# Patient Record
Sex: MALE | Race: WHITE | NOT HISPANIC OR LATINO | Employment: UNEMPLOYED | URBAN - METROPOLITAN AREA
[De-identification: names, ages, dates, MRNs, and addresses within clinical notes are randomized per-mention and may not be internally consistent; named-entity substitution may affect disease eponyms.]

---

## 2017-06-12 ENCOUNTER — OFFICE VISIT (OUTPATIENT)
Dept: FAMILY MEDICINE | Facility: CLINIC | Age: 47
End: 2017-06-12

## 2017-06-12 VITALS
WEIGHT: 215.4 LBS | HEIGHT: 71 IN | OXYGEN SATURATION: 98 % | RESPIRATION RATE: 16 BRPM | BODY MASS INDEX: 30.15 KG/M2 | DIASTOLIC BLOOD PRESSURE: 84 MMHG | SYSTOLIC BLOOD PRESSURE: 122 MMHG | HEART RATE: 59 BPM

## 2017-06-12 DIAGNOSIS — E66.09 NON MORBID OBESITY DUE TO EXCESS CALORIES: ICD-10-CM

## 2017-06-12 DIAGNOSIS — F43.10 PTSD (POST-TRAUMATIC STRESS DISORDER): ICD-10-CM

## 2017-06-12 DIAGNOSIS — Z00.00 ROUTINE GENERAL MEDICAL EXAMINATION AT A HEALTH CARE FACILITY: Primary | ICD-10-CM

## 2017-06-12 DIAGNOSIS — Z72.0 CHEWS TOBACCO: ICD-10-CM

## 2017-06-12 DIAGNOSIS — Z51.81 ENCOUNTER FOR THERAPEUTIC DRUG MONITORING: ICD-10-CM

## 2017-06-12 DIAGNOSIS — Z23 NEED FOR HEPATITIS A IMMUNIZATION: ICD-10-CM

## 2017-06-12 PROCEDURE — 80076 HEPATIC FUNCTION PANEL: CPT | Mod: 90 | Performed by: FAMILY MEDICINE

## 2017-06-12 PROCEDURE — 80061 LIPID PANEL: CPT | Mod: 90 | Performed by: FAMILY MEDICINE

## 2017-06-12 PROCEDURE — 36415 COLL VENOUS BLD VENIPUNCTURE: CPT | Performed by: FAMILY MEDICINE

## 2017-06-12 PROCEDURE — 90632 HEPA VACCINE ADULT IM: CPT | Performed by: FAMILY MEDICINE

## 2017-06-12 PROCEDURE — 99396 PREV VISIT EST AGE 40-64: CPT | Mod: 25 | Performed by: FAMILY MEDICINE

## 2017-06-12 RX ORDER — CLONAZEPAM 0.5 MG/1
1 TABLET ORAL 2 TIMES DAILY
COMMUNITY
Start: 2017-05-22 | End: 2018-07-20

## 2017-06-12 RX ORDER — FLUOXETINE 10 MG/1
1 CAPSULE ORAL DAILY
COMMUNITY
Start: 2017-05-22 | End: 2018-07-20
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
## 2017-06-12 NOTE — MR AVS SNAPSHOT
After Visit Summary   6/12/2017    Hans Jules    MRN: [id number]           Patient Information     Date Of Birth          1970        Visit Information        Provider Department      6/12/2017 7:45 AM Elvis Balderas MD [facility]        Today's Diagnoses     Routine general medical examination at a health care facility    -  1    Encounter for therapeutic drug monitoring        PTSD (post-traumatic stress disorder)        Need for hepatitis A immunization          Care Instructions      Preventive Health Recommendations  Male Ages 40 to 49    Yearly exam:             See your health care provider every year in order to  o   Review health changes.   o   Discuss preventive care.    o   Review your medicines if your doctor has prescribed any.    You should be tested each year for STDs (sexually transmitted diseases) if you re at risk.     Have a cholesterol test every 5 years.     Have a colonoscopy (test for colon cancer) if someone in your family has had colon cancer or polyps before age 50.     After age 45, have a diabetes test (fasting glucose). If you are at risk for diabetes, you should have this test every 3 years.      Talk with your health care provider about whether or not a prostate cancer screening test (PSA) is right for you.    Shots: Get a flu shot each year. Get a tetanus shot every 10 years.     Nutrition:    Eat at least 5 servings of fruits and vegetables daily.     Eat whole-grain bread, whole-wheat pasta and brown rice instead of white grains and rice.     Talk to your provider about Calcium and Vitamin D.     Lifestyle    Exercise for at least 150 minutes a week (30 minutes a day, 5 days a week). This will help you control your weight and prevent disease.     Limit alcohol to one drink per day.     No smoking.     Wear sunscreen to prevent skin cancer.     See your dentist every six months for an exam and cleaning.              Follow-ups after your visit    "     Who to contact     If you have questions or need follow up information about today's clinic visit or your schedule please contact [facility] directly at [phone].  Normal or non-critical lab and imaging results will be communicated to you by [employer], letter or phone within 4 business days after the clinic has received the results. If you do not hear from us within 7 days, please contact the clinic through [employer] or phone. If you have a critical or abnormal lab result, we will notify you by phone as soon as possible.  Submit refill requests through [employer] or call your pharmacy and they will forward the refill request to us. Please allow 3 business days for your refill to be completed.          Additional Information About Your Visit        [employer] Information     [employer] lets you send messages to your doctor, view your test results, renew your prescriptions, schedule appointments and more. To sign up, go to [email or web address] . Click on \"Log in\" on the left side of the screen, which will take you to the Welcome page. Then click on \"Sign up Now\" on the right side of the page.     You will be asked to enter the access code listed below, as well as some personal information. Please follow the directions to create your username and password.     Your access code is: [email or web address]  Expires: 9/10/2017  8:20 AM     Your access code will  in 90 days. If you need help or a new code, please call your [facility] clinic or [phone].        Care EveryWhere ID     This is your Care EveryWhere ID. This could be used by other organizations to access your [facility] medical records  [id number]        Your Vitals Were     Pulse Respirations Height Pulse Oximetry BMI (Body Mass Index)       59 16 1.797 m (5' 10.75\") 98% 30.25 kg/m2        Blood Pressure from Last 3 Encounters:   17 122/84   11/10/15 116/90   14 (!) 139/96    Weight from Last 3 Encounters:   17 97.7 kg (215 lb 6.4 " oz)   11/10/15 99.8 kg (220 lb)   12/26/14 101.5 kg (223 lb 12.8 oz)              We Performed the Following     HEP A IMMUNIZATION ([facility])     Hepatic Function Panel (7) ([facility])     Lipid Panel ([facility])          Today's Medication Changes          These changes are accurate as of: 6/12/17  8:20 AM.  If you have any questions, ask your nurse or doctor.               Stop taking these medicines if you haven't already. Please contact your care team if you have questions.     SUDAFED 12 HOUR 120 MG 12 hr tablet   Generic drug:  pseudoePHEDrine   Stopped by:  Elvis Balderas MD                    Primary Care Provider Office Phone # Fax #    Elvis Balderas [phone]       [facility] [address]  [facility] [address]        Thank you!     Thank you for choosing [facility]  for your care. Our goal is always to provide you with excellent care. Hearing back from our patients is one way we can continue to improve our services. Please take a few minutes to complete the written survey that you may receive in the mail after your visit with us. Thank you!             Your Updated Medication List - Protect others around you: Learn how to safely use, store and throw away your medicines at www.disposemymeds.org.          This list is accurate as of: 6/12/17  8:20 AM.  Always use your most recent med list.                   Brand Name Dispense Instructions for use    acetaminophen 500 MG tablet    TYLENOL     Take 500-1,000 mg by mouth as needed for mild pain       clonazePAM 0.5 MG tablet    klonoPIN     Take 1 tablet by mouth 2 times daily       FLUoxetine 10 MG capsule    PROzac     Take 1 capsule by mouth daily       traZODone 50 MG tablet    DESYREL     Take by mouth At Bedtime

## 2017-06-12 NOTE — LETTER
[facility] [place] [id number] [place]  [address]  Phone: [phone]    June 13, 2017      Hans AUSTIN Dhara  [address] [facility] [address]              Dear Hans,    The results from your recent visit are enclosed.    I'm pleased to you that your cholesterol looks fabulous.    Also liver functions are normal as expected.    It was pleasure to see you have a good summer.        Sincerely,     Elvis Hassan M.D.    Results for orders placed or performed in visit on 06/12/17   Lipid Panel ([facility])   Result Value Ref Range    Cholesterol 196 100 - 199 mg/dL    Triglycerides 49 0 - 149 mg/dL    HDL Cholesterol 77 >39 mg/dL    VLDL Cholesterol Jairo 10 5 - 40 mg/dL    LDL Cholesterol Calculated 109 (H) 0 - 99 mg/dL    LDL/HDL Ratio 1.4 0.0 - 3.6 ratio units    Narrative    Performed at:  01 - [facility] [address], [place], [place]  [id number]  : Adama Pizano MD, Phone:  [phone]   Hepatic Function Panel (7) ([facility])   Result Value Ref Range    Protein Total 6.5 6.0 - 8.5 g/dL    Albumin 4.7 3.5 - 5.5 g/dL    Bilirubin Total 0.4 0.0 - 1.2 mg/dL    Bilirubin Direct 0.11 0.00 - 0.40 mg/dL    Alkaline Phosphatase 52 39 - 117 IU/L    AST 18 0 - 40 IU/L    ALT 20 0 - 44 IU/L    Narrative    Performed at:  01 - [facility] [address], [place], [place]  [id number]  : Adama Pizano MD, Phone:  [phone]

## 2017-06-13 LAB
ALBUMIN SERPL-MCNC: 4.7 G/DL (ref 3.5–5.5)
ALP SERPL-CCNC: 52 IU/L (ref 39–117)
ALT SERPL-CCNC: 20 IU/L (ref 0–44)
AST SERPL-CCNC: 18 IU/L (ref 0–40)
BILIRUB SERPL-MCNC: 0.4 MG/DL (ref 0–1.2)
BILIRUBIN, DIRECT: 0.11 MG/DL (ref 0–0.4)
CHOLEST SERPL-MCNC: 196 MG/DL (ref 100–199)
HDLC SERPL-MCNC: 77 MG/DL
LDL/HDL RATIO: 1.4 RATIO UNITS (ref 0–3.6)
LDLC SERPL CALC-MCNC: 109 MG/DL (ref 0–99)
PROT SERPL-MCNC: 6.5 G/DL (ref 6–8.5)
TRIGL SERPL-MCNC: 49 MG/DL (ref 0–149)
VLDLC SERPL CALC-MCNC: 10 MG/DL (ref 5–40)

## 2018-07-20 ENCOUNTER — HOSPITAL ENCOUNTER (EMERGENCY)
Facility: CLINIC | Age: 48
Discharge: OTHER INSTITUTION | End: 2018-07-20
Attending: EMERGENCY MEDICINE | Admitting: EMERGENCY MEDICINE
Payer: COMMERCIAL

## 2018-07-20 VITALS
DIASTOLIC BLOOD PRESSURE: 98 MMHG | SYSTOLIC BLOOD PRESSURE: 146 MMHG | RESPIRATION RATE: 14 BRPM | TEMPERATURE: 97 F | OXYGEN SATURATION: 97 % | HEART RATE: 103 BPM

## 2018-07-20 DIAGNOSIS — F10.220 ACUTE ALCOHOLIC INTOXICATION IN ALCOHOLISM WITHOUT COMPLICATION (H): ICD-10-CM

## 2018-07-20 LAB — ALCOHOL BREATH TEST: 0.33 (ref 0–0.01)

## 2018-07-20 PROCEDURE — 25000125 ZZHC RX 250: Performed by: EMERGENCY MEDICINE

## 2018-07-20 PROCEDURE — 96366 THER/PROPH/DIAG IV INF ADDON: CPT

## 2018-07-20 PROCEDURE — 25000128 H RX IP 250 OP 636: Performed by: EMERGENCY MEDICINE

## 2018-07-20 PROCEDURE — 99285 EMERGENCY DEPT VISIT HI MDM: CPT | Mod: 25

## 2018-07-20 PROCEDURE — 96365 THER/PROPH/DIAG IV INF INIT: CPT

## 2018-07-20 RX ORDER — CLONAZEPAM 0.5 MG/1
0.5 TABLET ORAL 2 TIMES DAILY
Qty: 6 TABLET | Refills: 0 | Status: SHIPPED | OUTPATIENT
Start: 2018-07-20 | End: 2018-09-21

## 2018-07-20 RX ORDER — TRAZODONE HYDROCHLORIDE 50 MG/1
50 TABLET, FILM COATED ORAL AT BEDTIME
Qty: 3 TABLET | Refills: 0 | Status: SHIPPED | OUTPATIENT
Start: 2018-07-20 | End: 2018-08-22

## 2018-07-20 RX ORDER — FLUOXETINE 10 MG/1
10 CAPSULE ORAL DAILY
Qty: 3 CAPSULE | Refills: 0 | Status: SHIPPED | OUTPATIENT
Start: 2018-07-20 | End: 2018-09-21

## 2018-07-20 RX ADMIN — FOLIC ACID: 5 INJECTION, SOLUTION INTRAMUSCULAR; INTRAVENOUS; SUBCUTANEOUS at 10:22

## 2018-07-20 NOTE — ED AVS SNAPSHOT
Emergency Department    [address] [facility] [address]    Phone:  [phone]    Fax:  [phone]                                       Hnas Jules   MRN: [id number]    Department:   Emergency Department   Date of Visit:  7/20/2018           After Visit Summary Signature Page     I have received my discharge instructions, and my questions have been answered. I have discussed any challenges I see with this plan with the nurse or doctor.    ..........................................................................................................................................  Patient/Patient Representative Signature      ..........................................................................................................................................  Patient Representative Print Name and Relationship to Patient    ..................................................               ................................................  Date                                            Time    ..........................................................................................................................................  Reviewed by Signature/Title    ...................................................              ..............................................  Date                                                            Time

## 2018-07-20 NOTE — ED NOTES
Bed: ED17  Expected date:   Expected time:   Means of arrival:   Comments:  [facility] - [facility] - 48 M ETOH eta 0824

## 2018-07-20 NOTE — ED PROVIDER NOTES
History     Chief Complaint:  Alcohol intoxication     HPI   Limited due to patient's intoxication; supplemented by wife.  Hans Jules is a 48 year old male who presents with family for evaluation of alcohol intoxication. The patient was last in outpatient treatment about two years ago and has never been in inpatient treatment. He was sober for a number of months-years until he began drinking vodka heavily and daily 7 days ago. No other coingestants. Last solid PO intake was last night. He has no history of withdrawal seizures. The patient states he would like help, though wife expresses concern that once sober patient may refuse treatment. Wife notes patient was unable to walk independently this morning. They voice no complaints.     Allergies:  No Known Allergies     Medications:    Trazodone  Clonazepam  Fluoxetine      Past Medical History:    PTSD    Past Surgical History:    Ankle and femur orthopedic surgery  ENT surgery     Family History:    Brain aneurysm     Social History:  Current tobacco chewer. Consumes alcohol.  Marital Status:   [2]  Here with wife and their young daughter.     Review of Systems   Constitutional:        Intoxicated   Musculoskeletal: Positive for gait problem.   All other systems reviewed and are negative.      Physical Exam     Patient Vitals for the past 24 hrs:   BP Temp Temp src Pulse Heart Rate Resp SpO2   07/20/18 1245 - - - - - - 93 %   07/20/18 1230 107/71 - - - - - 94 %   07/20/18 1215 - - - - - - 93 %   07/20/18 1200 104/66 - - - - - 93 %   07/20/18 1145 - - - - - - 96 %   07/20/18 1130 107/66 - - - - - 97 %   07/20/18 1115 - - - - - - 90 %   07/20/18 1100 110/67 - - - - - 90 %   07/20/18 1045 - - - - - - 92 %   07/20/18 1030 - - - - - - 94 %   07/20/18 0900 112/64 97  F (36.1  C) Oral 121 121 14 90 %        Physical Exam  Nursing note and vitals reviewed.    Constitutional:  Appears well-developed and well-nourished, comfortable. Intoxicated and smells of  alcohol.  HENT:     Nose normal.  No discharge.      Oropharynx is clear and moist.  Eyes:    Conjunctivae are normal without injection. No lid droop.     Pupils are equal, round, and reactive to light.      Right eye exhibits no discharge. Left eye exhibits no discharge.      No scleral icterus.  Lymph:  No enlarged or tender cervical or submandibular lymph nodes.   Cardiovascular:  Normal rate, regular rhythm with normal S1 and S2.      Normal heart sounds and peripheral pulses 2+ and equal.       No murmur or jarett.  Pulmonary:  Effort normal and breath sounds clear to auscultation bilaterally   No respiratory distress.  No stridor.     No wheezes. No rales.     GI:    Soft. No distension and no mass. No tenderness.      No rebound and no guarding. No flank pain.  No HSM.  Musculoskeletal:  Normal range of motion. No extremity deformity.     No edema and no tenderness.  No cyanosis.                                      Neck supple, no midline spinal tenderness.   Neurological:   Alert. Intoxicated. Psychomotor slowness. Unsteady gait.     No cranial nerve deficit, no facial droop.     Exhibits good muscle tone.      GCS eye subscore is 4. GCS verbal subscore is 5.      GCS motor subscore is 6.   Skin:    Skin is warm and dry. No rash noted. No diaphoresis.      No erythema. No pallor.  No lesions.  Psychiatric:   Intoxicated, slurred speech        Emergency Department Course   Laboratory:  Laboratory findings were communicated with the patient and family who voiced understanding of the findings.  Alcohol breathalyzer (at 0912): 0.333      Interventions:  1022: Banana bag - NS 1L, Multivitamin 10 ml, Thiamine 100 mg, Folic acid 1 mg, IV infusion     Emergency Department Course:  Patient placed in behavioral health scrubs. Belongings locked away.  Past medical records, nursing notes, and vitals reviewed.   0914: I performed an exam of the patient as documented above.   Discussed the case with the DEC , who  will evaluate patient in the ED when patient is clinically sober.    Peripheral IV access established. The patient was given the above interventions with improvement.    Bed found at [address] [place] [address] detox. The patient will be transferred to [address] [place] [address] via EMS.  [facility] paperwork and transport paperwork was filled out and sent with the patient.     Impression & Plan    Medical Decision Making:   Hans Jules is a 48 year old male who presents for evaluation of alcohol abuse and intoxication.  They are intoxicated here in ED by lab evaluation.   Patient has no history of Delirium tremens or alcohol withdrawal seizures. There are no signs of co-ingestion including acetaminophen, drugs, medications, volatile alcohols.  There are no signs of trauma related to alcohol use and no further workup is needed including head CT.   Thiamine/folate/multivitamin given. Three days of regular medications ordered.     The patient is stable for transfer. Signed out to my partner, Dr. Ayon, pending transfer to [address] [place] [address].    Diagnosis:    ICD-10-CM    1. Acute alcoholic intoxication in alcoholism without complication (H) F10.220      To detox.  Recommend you get in for inpatient treatment after discharge.  Follow-up with your doctor to help facilitate this.    Disposition:   Signed out to my partner, Dr. Ayon, pending transfer to [address] [place] [address] via EMS    Scribe Disclosure:  I, Segundo Delaney, am serving as a scribe at 9:05 AM on 7/20/2018 to document services personally performed by Della El MD based on my observations and the provider's statements to me.    Segundo Delaney  7/20/2018   EMERGENCY DEPARTMENT      Della El MD  07/20/18 2251

## 2018-07-20 NOTE — ED AVS SNAPSHOT
Emergency Department    [address] [facility] [address]    Phone:  [phone]    Fax:  [phone]                                       Hans Jules   MRN: [id number]    Department:   Emergency Department   Date of Visit:  7/20/2018           Patient Information     Date Of Birth          1970        Your diagnoses for this visit were:     Acute alcoholic intoxication in alcoholism without complication (H)        You were seen by Della El MD.      Follow-up Information     Schedule an appointment as soon as possible for a visit with Mary Lou Montano MD.    Specialty:  Family Practice    Contact information:    [address] [place] [address] [phone]          Discharge Instructions       To detox.  Recommend you get in for inpatient treatment after discharge.  Follow-up with your doctor to help facilitate this.        Discharge References/Attachments     ALCOHOLISM: RESOURCES FOR FAMILY AND FRIENDS (ENGLISH)    GETTING HELP, ALCOHOLISM (ENGLISH)      Your next 10 appointments already scheduled     Aug 06, 2018  8:30 AM CDT   PHYSICAL with Mary Lou Montano MD   [facility]    [address] [place] [address] [phone]              24 Hour Appointment Hotline       To make an appointment at any [facility], call [phone] ([phone]). If you don't have a family doctor or clinic, we will help you find one. [place] clinics are conveniently located to serve the needs of you and your family.             Review of your medicines      CONTINUE these medicines which may have CHANGED, or have new prescriptions. If we are uncertain of the size of tablets/capsules you have at home, strength may be listed as something that might have changed.        Dose / Directions Last dose taken    traZODone 50 MG tablet   Commonly known as:  DESYREL   Dose:  50 mg   What changed:  how much to take   Quantity:   3 tablet        Take 1 tablet (50 mg) by mouth At Bedtime for 3 days   Refills:  0          Our records show that you are taking the medicines listed below. If these are incorrect, please call your family doctor or clinic.        Dose / Directions Last dose taken    acetaminophen 500 MG tablet   Commonly known as:  TYLENOL   Dose:  500-1000 mg        Take 500-1,000 mg by mouth as needed for mild pain   Refills:  0        clonazePAM 0.5 MG tablet   Commonly known as:  klonoPIN   Dose:  0.5 mg   Quantity:  6 tablet        Take 1 tablet (0.5 mg) by mouth 2 times daily for 3 days   Refills:  0        FLUoxetine 10 MG capsule   Commonly known as:  PROzac   Dose:  10 mg   Quantity:  3 capsule        Take 1 capsule (10 mg) by mouth daily for 3 days   Refills:  0                Prescriptions were sent or printed at these locations (3 Prescriptions)                   Other Prescriptions                Printed at Department/Unit printer (3 of 3)         clonazePAM (KLONOPIN) 0.5 MG tablet               FLUoxetine (PROZAC) 10 MG capsule               traZODone (DESYREL) 50 MG tablet                Procedures and tests performed during your visit     Alcohol breath test POCT      Orders Needing Specimen Collection     None      Pending Results     No orders found from 7/18/2018 to 7/21/2018.            Pending Culture Results     No orders found from 7/18/2018 to 7/21/2018.            Pending Results Instructions     If you had any lab results that were not finalized at the time of your Discharge, you can call the ED Lab Result RN at [phone]. You will be contacted by this team for any positive Lab results or changes in treatment. The nurses are available 7 days a week from 10A to 6:30P.  You can leave a message 24 hours per day and they will return your call.        Test Results From Your Hospital Stay        7/20/2018 11:48 AM      Component Results     Component Value Ref Range & Units Status    Alcohol Breath Test 0.333  (A) 0.00 - 0.01 Final    done by [facility] [place]                Clinical Quality Measure: Blood Pressure Screening     Your blood pressure was checked while you were in the emergency department today. The last reading we obtained was  BP: 107/71 . Please read the guidelines below about what these numbers mean and what you should do about them.  If your systolic blood pressure (the top number) is less than 120 and your diastolic blood pressure (the bottom number) is less than 80, then your blood pressure is normal. There is nothing more that you need to do about it.  If your systolic blood pressure (the top number) is 120-139 or your diastolic blood pressure (the bottom number) is 80-89, your blood pressure may be higher than it should be. You should have your blood pressure rechecked within a year by a primary care provider.  If your systolic blood pressure (the top number) is 140 or greater or your diastolic blood pressure (the bottom number) is 90 or greater, you may have high blood pressure. High blood pressure is treatable, but if left untreated over time it can put you at risk for heart attack, stroke, or kidney failure. You should have your blood pressure rechecked by a primary care provider within the next 4 weeks.  If your provider in the emergency department today gave you specific instructions to follow-up with your doctor or provider even sooner than that, you should follow that instruction and not wait for up to 4 weeks for your follow-up visit.        Thank you for choosing [facility]       Thank you for choosing [facility] for your care. Our goal is always to provide you with excellent care. Hearing back from our patients is one way we can continue to improve our services. Please take a few minutes to complete the written survey that you may receive in the mail after you visit with us. Thank you!        [employer] Information     "[employer] lets you send messages to your doctor, view your test results, renew your  "prescriptions, schedule appointments and more. To sign up, go to [email or web address] . Click on \"Log in\" on the left side of the screen, which will take you to the Welcome page. Then click on \"Sign up Now\" on the right side of the page.     You will be asked to enter the access code listed below, as well as some personal information. Please follow the directions to create your username and password.     Your access code is: [email or web address]  Expires: 10/18/2018  9:09 AM     Your access code will  in 90 days. If you need help or a new code, please call your [facility] [place] clinic or [phone].        Care EveryWhere ID     This is your Care EveryWhere ID. This could be used by other organizations to access your [facility] [place] medical records  [id number]        Equal Access to Services     DARYL JONES : Jenny Reyna, walter leslie, radha mata, lissy myers . So [facility] [phone].    ATENCIÓN: Si habla español, tiene a carpio disposición servicios gratuitos de asistencia lingüística. Llame al [phone].    We comply with applicable federal civil rights laws and [place] laws. We do not discriminate on the basis of race, color, national origin, age, disability, sex, sexual orientation, or gender identity.            After Visit Summary       This is your record. Keep this with you and show to your community pharmacist(s) and doctor(s) at your next visit.                  "

## 2018-07-20 NOTE — DISCHARGE INSTRUCTIONS
To detox.  Recommend you get in for inpatient treatment after discharge.  Follow-up with your doctor to help facilitate this.
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
## 2018-08-06 ENCOUNTER — OFFICE VISIT (OUTPATIENT)
Dept: FAMILY MEDICINE | Facility: CLINIC | Age: 48
End: 2018-08-06

## 2018-08-06 VITALS
RESPIRATION RATE: 16 BRPM | HEART RATE: 53 BPM | BODY MASS INDEX: 28.98 KG/M2 | HEIGHT: 71 IN | DIASTOLIC BLOOD PRESSURE: 100 MMHG | SYSTOLIC BLOOD PRESSURE: 143 MMHG | OXYGEN SATURATION: 97 % | WEIGHT: 207 LBS

## 2018-08-06 DIAGNOSIS — R03.0 ELEVATED BLOOD PRESSURE READING WITHOUT DIAGNOSIS OF HYPERTENSION: ICD-10-CM

## 2018-08-06 DIAGNOSIS — Z13.220 ENCOUNTER FOR LIPID SCREENING FOR CARDIOVASCULAR DISEASE: ICD-10-CM

## 2018-08-06 DIAGNOSIS — F43.10 PTSD (POST-TRAUMATIC STRESS DISORDER): ICD-10-CM

## 2018-08-06 DIAGNOSIS — Z00.00 ROUTINE GENERAL MEDICAL EXAMINATION AT A HEALTH CARE FACILITY: Primary | ICD-10-CM

## 2018-08-06 DIAGNOSIS — F10.21 ALCOHOLISM IN REMISSION (H): ICD-10-CM

## 2018-08-06 DIAGNOSIS — Z13.6 ENCOUNTER FOR LIPID SCREENING FOR CARDIOVASCULAR DISEASE: ICD-10-CM

## 2018-08-06 DIAGNOSIS — Z01.84 IMMUNITY STATUS TESTING: ICD-10-CM

## 2018-08-06 DIAGNOSIS — R19.5 POSITIVE COLORECTAL CANCER SCREENING USING DNA-BASED STOOL TEST: ICD-10-CM

## 2018-08-06 DIAGNOSIS — F41.1 GAD (GENERALIZED ANXIETY DISORDER): ICD-10-CM

## 2018-08-06 LAB
% GRANULOCYTES: 65.9 % (ref 42.2–75.2)
HCT VFR BLD AUTO: 45.2 % (ref 39–51)
HEMOGLOBIN: 15.4 G/DL (ref 13.4–17.5)
LYMPHOCYTES NFR BLD AUTO: 25.9 % (ref 20.5–51.1)
MCH RBC QN AUTO: 31.4 PG (ref 27–31)
MCHC RBC AUTO-ENTMCNC: 34.1 G/DL (ref 33–37)
MCV RBC AUTO: 92.2 FL (ref 80–100)
MONOCYTES NFR BLD AUTO: 8.2 % (ref 1.7–9.3)
PLATELET # BLD AUTO: 355 K/UL (ref 140–450)
RBC # BLD AUTO: 4.9 X10/CMM (ref 4.2–5.9)
WBC # BLD AUTO: 4.4 X10/CMM (ref 3.8–11)

## 2018-08-06 PROCEDURE — 82746 ASSAY OF FOLIC ACID SERUM: CPT | Mod: 90 | Performed by: FAMILY MEDICINE

## 2018-08-06 PROCEDURE — 86706 HEP B SURFACE ANTIBODY: CPT | Mod: 90 | Performed by: FAMILY MEDICINE

## 2018-08-06 PROCEDURE — 82306 VITAMIN D 25 HYDROXY: CPT | Mod: 90 | Performed by: FAMILY MEDICINE

## 2018-08-06 PROCEDURE — 80053 COMPREHEN METABOLIC PANEL: CPT | Mod: 90 | Performed by: FAMILY MEDICINE

## 2018-08-06 PROCEDURE — 36415 COLL VENOUS BLD VENIPUNCTURE: CPT | Performed by: FAMILY MEDICINE

## 2018-08-06 PROCEDURE — 85025 COMPLETE CBC W/AUTO DIFF WBC: CPT | Performed by: FAMILY MEDICINE

## 2018-08-06 PROCEDURE — 80061 LIPID PANEL: CPT | Mod: 90 | Performed by: FAMILY MEDICINE

## 2018-08-06 PROCEDURE — 84425 ASSAY OF VITAMIN B-1: CPT | Mod: 90 | Performed by: FAMILY MEDICINE

## 2018-08-06 PROCEDURE — 82607 VITAMIN B-12: CPT | Mod: 90 | Performed by: FAMILY MEDICINE

## 2018-08-06 PROCEDURE — 99396 PREV VISIT EST AGE 40-64: CPT | Performed by: FAMILY MEDICINE

## 2018-08-06 RX ORDER — ATENOLOL 25 MG/1
25 TABLET ORAL 2 TIMES DAILY
COMMUNITY
Start: 2018-08-01

## 2018-08-06 RX ORDER — DISULFIRAM 250 MG/1
TABLET ORAL
COMMUNITY
Start: 2018-07-25

## 2018-08-06 NOTE — LETTER
[facility]  [address] [place]  [address]  Phone: [phone]    August 13, 2018      Hans NORMAN Dhara  [address]  [facility] [address]              Dear Hans,    The results from your recent visit showed CBC was ok  Thiamine B1 was high  CMP was ok with minor changes. Potassium was slightly elevated. AVOID Foods high in Potassium: Avocados, spinach, sweet potatos, coconut water, yogurt, white beans, bananas, acorn squash, dried apricost, mushrooms. And recheck in 2-4 weeks.  LDL with mild elevation, good risk ratio.   Vitamin D was low. Take a supplement.  B12 was fine.  Hep B tested :NOT IMMUNE consider getting the vaccine 3 part series        Sincerely,     Mary Lou Montano M.D.    Results for orders placed or performed in visit on 08/06/18   Comp. Metabolic Panel (14) ([facility])   Result Value Ref Range    Glucose 89 65 - 99 mg/dL    Urea Nitrogen 6 6 - 24 mg/dL    Creatinine 0.90 0.76 - 1.27 mg/dL    eGFR If NonAfricn Am 100 >59 mL/min/1.73    eGFR If Africn Am 116 >59 mL/min/1.73    BUN/Creatinine Ratio 7 (L) 9 - 20    Sodium 134 134 - 144 mmol/L    Potassium 5.3 (H) 3.5 - 5.2 mmol/L    Chloride 96 96 - 106 mmol/L    Total CO2 26 20 - 29 mmol/L    Calcium 9.6 8.7 - 10.2 mg/dL    Protein Total 6.7 6.0 - 8.5 g/dL    Albumin 4.6 3.5 - 5.5 g/dL    Globulin, Total 2.1 1.5 - 4.5 g/dL    A/G Ratio 2.2 1.2 - 2.2    Bilirubin Total 0.4 0.0 - 1.2 mg/dL    Alkaline Phosphatase 73 39 - 117 IU/L    AST 14 0 - 40 IU/L    ALT 28 0 - 44 IU/L    Narrative    Performed at:  01 - [facility] [address], [place], [place]  [id number]  : Ramakrishna Boyd MD, Phone:  [phone]   Lipid Panel ([facility])   Result Value Ref Range    Cholesterol 192 100 - 199 mg/dL    Triglycerides 48 0 - 149 mg/dL    HDL Cholesterol 76 >39 mg/dL    VLDL Cholesterol Jairo 10 5 - 40 mg/dL    LDL Cholesterol Calculated 106 (H) 0 - 99 mg/dL    LDL/HDL Ratio 1.4 0.0 - 3.6 ratio    Narrative    Performed at:  01 -  [facility] [address], [place], [place]  [id number]  : Ramakrishna Boyd MD, Phone:  [phone]   CBC with Diff/Plt ([facility]   Result Value Ref Range    WBC x10/cmm 4.4 3.8 - 11.0 x10/cmm    % Lymphocytes 25.9 20.5 - 51.1 %    % Monocytes 8.2 1.7 - 9.3 %    % Granulocytes 65.9 42.2 - 75.2 %    RBC x10/cmm 4.90 4.2 - 5.9 x10/cmm    Hemoglobin 15.4 13.4 - 17.5 g/dl    Hematocrit 45.2 39 - 51 %    MCV 92.2 80 - 100 fL    MCH 31.4 (A) 27.0 - 31.0 pg    MCHC 34.1 33.0 - 37.0 g/dL    Platelet Count 355 140 - 450 K/uL   Vitamin D  25-Hydroxy ([facility])   Result Value Ref Range    Vitamin D,25-Hydroxy 19.2 (L) 30.0 - 100.0 ng/mL    Narrative    Performed at:  01 - [facility] [address], [place], [place]  [id number]  : Ramakrishna Boyd MD, Phone:  [phone]   Vitamin B12 and Folate ([facility])   Result Value Ref Range    Vitamin B12 690 232 - 1245 pg/mL    Folate >20.0 >3.0 ng/mL    Narrative    Performed at:  01 - [facility] [address], [place], [place]  [id number]  : Ramakrishna Boyd MD, Phone:  [phone]   Hep B Surface Ab ([facility])   Result Value Ref Range    Hep B Surface Margarita Qual Non Reactive     Narrative    Performed at:  01 - [facility] [address], [place], [place]  [id number]  : Ramakrishna Boyd MD, Phone:  [phone]   Vitamin B1 Whole Blood ([facility])   Result Value Ref Range    Vitamin B1 Whole Blood 219.0 (H) 66.5 - 200.0 nmol/L    Narrative    Performed at:  01 - [address]  [id number]  : Otis Fierro MD, Phone:  [phone]

## 2018-08-06 NOTE — MR AVS SNAPSHOT
After Visit Summary   8/6/2018    Hans Jules    MRN: [id number]           Patient Information     Date Of Birth          1970        Visit Information        Provider Department      8/6/2018 8:30 AM Mary Lou Montano MD [facility]        Today's Diagnoses     Routine general medical examination at a health care facility    -  1    Encounter for lipid screening for cardiovascular disease        Alcoholism in remission (H)        Immunity status testing        PTSD (post-traumatic stress disorder)        SANTANA (generalized anxiety disorder)        Elevated blood pressure reading without diagnosis of hypertension          Care Instructions      Preventive Health Recommendations  Male Ages 40 to 49    Yearly exam:             See your health care provider every year in order to  o   Review health changes.   o   Discuss preventive care.    o   Review your medicines if your doctor has prescribed any.    You should be tested each year for STDs (sexually transmitted diseases) if you re at risk.     Have a cholesterol test every 5 years.     Have a colonoscopy (test for colon cancer) if someone in your family has had colon cancer or polyps before age 50.     After age 45, have a diabetes test (fasting glucose). If you are at risk for diabetes, you should have this test every 3 years.      Talk with your health care provider about whether or not a prostate cancer screening test (PSA) is right for you.    Shots: Get a flu shot each year. Get a tetanus shot every 10 years.     Nutrition:    Eat at least 5 servings of fruits and vegetables daily.     Eat whole-grain bread, whole-wheat pasta and brown rice instead of white grains and rice.     Get adequate Calcium and Vitamin D.     Lifestyle    Exercise for at least 150 minutes a week (30 minutes a day, 5 days a week). This will help you control your weight and prevent disease.     Limit alcohol to one drink per day.     No smoking.     Wear  "sunscreen to prevent skin cancer.     See your dentist every six months for an exam and cleaning.              Follow-ups after your visit        Your next 10 appointments already scheduled     Aug 22, 2018  8:30 AM CDT   [facility] with [facility]   [facility]    [address] [place] [address] [phone]              Who to contact     If you have questions or need follow up information about today's clinic visit or your schedule please contact [facility] directly at [phone].  Normal or non-critical lab and imaging results will be communicated to you by PUSH Wellnesshart, letter or phone within 4 business days after the clinic has received the results. If you do not hear from us within 7 days, please contact the clinic through EverySignalt or phone. If you have a critical or abnormal lab result, we will notify you by phone as soon as possible.  Submit refill requests through Jukedocs or call your pharmacy and they will forward the refill request to us. Please allow 3 business days for your refill to be completed.          Additional Information About Your Visit        MyChart Information     Jukedocs lets you send messages to your doctor, view your test results, renew your prescriptions, schedule appointments and more. To sign up, go to [email or web address] . Click on \"Log in\" on the left side of the screen, which will take you to the Welcome page. Then click on \"Sign up Now\" on the right side of the page.     You will be asked to enter the access code listed below, as well as some personal information. Please follow the directions to create your username and password.     Your access code is: [email or web address]  Expires: 10/18/2018  9:09 AM     Your access code will  in 90 days. If you need help or a new code, please call your [place] clinic or [phone].        Care EveryWhere ID     This is your Care EveryWhere ID. This could be used by " "other organizations to access your [facility] medical records  [id number]        Your Vitals Were     Pulse Respirations Height Pulse Oximetry BMI (Body Mass Index)       53 16 1.803 m (5' 11\") 97% 28.87 kg/m2        Blood Pressure from Last 3 Encounters:   08/06/18 (!) 143/100   07/20/18 (!) 146/98   06/12/17 122/84    Weight from Last 3 Encounters:   08/06/18 93.9 kg (207 lb)   06/12/17 97.7 kg (215 lb 6.4 oz)   11/10/15 99.8 kg (220 lb)              We Performed the Following     CBC with Diff/Plt ([facility])     Comp. Metabolic Panel (14) ([facility])     Hep B Surface Ab ([facility])     Lipid Panel ([facility])     Vitamin B1 Whole Blood ([facility])     Vitamin B12 and Folate ([facility])     Vitamin D  25-Hydroxy ([facility])        Primary Care Provider Office Phone # Fax #    Mary Lou Montano [phone] [address]  [facility][note 3[facility] [address]        Equal Access to Services     [facility]: Hadii chandni alarcon hadasho Soomaali, waaxda luqadaha, qaybta kaalmada adeegyabakari, lissy myers . So [facility] [phone].    ATENCIÓN: Si habla español, tiene a carpio disposición servicios gratuitos de asistencia lingüística. Llame al [phone].    We comply with applicable federal civil rights laws and [place] laws. We do not discriminate on the basis of race, color, national origin, age, disability, sex, sexual orientation, or gender identity.            Thank you!     Thank you for choosing [facility]  for your care. Our goal is always to provide you with excellent care. Hearing back from our patients is one way we can continue to improve our services. Please take a few minutes to complete the written survey that you may receive in the mail after your visit with us. Thank you!             Your Updated Medication List - Protect others around you: Learn how to safely use, store and throw away your medicines at www.disposemymeds.org.          This list is accurate as of 8/6/18 10:20 " AM.  Always use your most recent med list.                   Brand Name Dispense Instructions for use Diagnosis    acetaminophen 500 MG tablet    TYLENOL     Take 500-1,000 mg by mouth as needed for mild pain        atenolol 25 MG tablet    TENORMIN          clonazePAM 0.5 MG tablet    klonoPIN    6 tablet    Take 1 tablet (0.5 mg) by mouth 2 times daily for 3 days        disulfiram 250 MG tablet    ANTABUSE          FLUoxetine 10 MG capsule    PROzac    3 capsule    Take 1 capsule (10 mg) by mouth daily for 3 days        traZODone 50 MG tablet    DESYREL    3 tablet    Take 1 tablet (50 mg) by mouth At Bedtime for 3 days

## 2018-08-07 LAB
ALBUMIN SERPL-MCNC: 4.6 G/DL (ref 3.5–5.5)
ALBUMIN/GLOB SERPL: 2.2 {RATIO} (ref 1.2–2.2)
ALP SERPL-CCNC: 73 IU/L (ref 39–117)
ALT SERPL-CCNC: 28 IU/L (ref 0–44)
AST SERPL-CCNC: 14 IU/L (ref 0–40)
BILIRUB SERPL-MCNC: 0.4 MG/DL (ref 0–1.2)
BUN SERPL-MCNC: 6 MG/DL (ref 6–24)
BUN/CREATININE RATIO: 7 (ref 9–20)
CALCIUM SERPL-MCNC: 9.6 MG/DL (ref 8.7–10.2)
CHLORIDE SERPLBLD-SCNC: 96 MMOL/L (ref 96–106)
CHOLEST SERPL-MCNC: 192 MG/DL (ref 100–199)
CREAT SERPL-MCNC: 0.9 MG/DL (ref 0.76–1.27)
EGFR IF AFRICN AM: 116 ML/MIN/1.73
EGFR IF NONAFRICN AM: 100 ML/MIN/1.73
FOLATE: >20 NG/ML
GLOBULIN, TOTAL: 2.1 G/DL (ref 1.5–4.5)
GLUCOSE SERPL-MCNC: 89 MG/DL (ref 65–99)
HDLC SERPL-MCNC: 76 MG/DL
HEP B SURFACE ABY QUAL: NON REACTIVE
LDL/HDL RATIO: 1.4 RATIO (ref 0–3.6)
LDLC SERPL CALC-MCNC: 106 MG/DL (ref 0–99)
POTASSIUM SERPL-SCNC: 5.3 MMOL/L (ref 3.5–5.2)
PROT SERPL-MCNC: 6.7 G/DL (ref 6–8.5)
SODIUM SERPL-SCNC: 134 MMOL/L (ref 134–144)
TOTAL CO2: 26 MMOL/L (ref 20–29)
TRIGL SERPL-MCNC: 48 MG/DL (ref 0–149)
VIT B12 SERPL-MCNC: 690 PG/ML (ref 232–1245)
VITAMIN D, 25-HYDROXY: 19.2 NG/ML (ref 30–100)
VLDLC SERPL CALC-MCNC: 10 MG/DL (ref 5–40)

## 2018-08-12 LAB — VITAMIN B1 WHOLE BLOOD: 219 NMOL/L (ref 66.5–200)
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
## 2018-08-22 ENCOUNTER — OFFICE VISIT (OUTPATIENT)
Dept: FAMILY MEDICINE | Facility: CLINIC | Age: 48
End: 2018-08-22

## 2018-08-22 VITALS
SYSTOLIC BLOOD PRESSURE: 114 MMHG | WEIGHT: 207 LBS | RESPIRATION RATE: 16 BRPM | OXYGEN SATURATION: 99 % | HEART RATE: 61 BPM | DIASTOLIC BLOOD PRESSURE: 74 MMHG | BODY MASS INDEX: 28.87 KG/M2

## 2018-08-22 DIAGNOSIS — F43.10 PTSD (POST-TRAUMATIC STRESS DISORDER): ICD-10-CM

## 2018-08-22 DIAGNOSIS — I10 BENIGN ESSENTIAL HYPERTENSION: ICD-10-CM

## 2018-08-22 DIAGNOSIS — Z23 VACCINE FOR VIRAL HEPATITIS: Primary | ICD-10-CM

## 2018-08-22 DIAGNOSIS — E87.5 HYPERPOTASSEMIA: ICD-10-CM

## 2018-08-22 DIAGNOSIS — F10.21 ALCOHOL DEPENDENCE IN REMISSION (H): ICD-10-CM

## 2018-08-22 PROCEDURE — 90746 HEPB VACCINE 3 DOSE ADULT IM: CPT | Performed by: FAMILY MEDICINE

## 2018-08-22 PROCEDURE — 90471 IMMUNIZATION ADMIN: CPT | Performed by: FAMILY MEDICINE

## 2018-08-22 PROCEDURE — 80048 BASIC METABOLIC PNL TOTAL CA: CPT | Mod: 90 | Performed by: FAMILY MEDICINE

## 2018-08-22 PROCEDURE — 99214 OFFICE O/P EST MOD 30 MIN: CPT | Mod: 25 | Performed by: FAMILY MEDICINE

## 2018-08-22 PROCEDURE — 36415 COLL VENOUS BLD VENIPUNCTURE: CPT | Performed by: FAMILY MEDICINE

## 2018-08-22 RX ORDER — SODIUM FLUORIDE 1.1 G/100G
CREAM ORAL
COMMUNITY
Start: 2018-04-11

## 2018-08-22 NOTE — PATIENT INSTRUCTIONS
Continue medication for blood pressure    Labs today    Hepatitis B vaccine  #1, in one month come for #2, the 6 months #3.

## 2018-08-22 NOTE — LETTER
[facility] [address] [place]  [address]  Phone: [phone]    August 27, 2018      Hans Jules  [facility] [facility] [address]              Dear Hans,    The results from your recent visit showed Normal BMP results.        Sincerely,     Mary Lou Montano M.D.    Results for orders placed or performed in visit on 08/22/18   Basic Metabolic Panel (8) ([facility])   Result Value Ref Range    Glucose 81 65 - 99 mg/dL    Urea Nitrogen 11 6 - 24 mg/dL    Creatinine 1.01 0.76 - 1.27 mg/dL    eGFR If NonAfricn Am 87 >59 mL/min/1.73    eGFR If Africn Am 100 >59 mL/min/1.73    BUN/Creatinine Ratio 11 9 - 20    Sodium 135 134 - 144 mmol/L    Potassium 5.0 3.5 - 5.2 mmol/L    Chloride 96 96 - 106 mmol/L    Total CO2 24 20 - 29 mmol/L    Calcium 10.0 8.7 - 10.2 mg/dL    Narrative    Performed at:  01 - [facility] [address], [place], [place]  [id number]  : Ramakrishna Boyd MD, Phone:  [phone]

## 2018-08-22 NOTE — MR AVS SNAPSHOT
"              After Visit Summary   8/22/2018    Hans Jules    MRN: [id number]           Patient Information     Date Of Birth          1970        Visit Information        Provider Department      8/22/2018 8:30 AM Mary Lou Montano MD [facility]        Today's Diagnoses     Vaccine for viral hepatitis    -  1    Hyperpotassemia        PTSD (post-traumatic stress disorder)        Benign essential hypertension          Care Instructions    Continue medication for blood pressure    Labs today    Hepatitis B vaccine  #1, in one month come for #2, the 6 months #3.          Follow-ups after your visit        Who to contact     If you have questions or need follow up information about today's clinic visit or your schedule please contact [facility] directly at [phone].  Normal or non-critical lab and imaging results will be communicated to you by Culture Kitchenhart, letter or phone within 4 business days after the clinic has received the results. If you do not hear from us within 7 days, please contact the clinic through Culture Kitchenhart or phone. If you have a critical or abnormal lab result, we will notify you by phone as soon as possible.  Submit refill requests through Winning Pitch or call your pharmacy and they will forward the refill request to us. Please allow 3 business days for your refill to be completed.          Additional Information About Your Visit        Culture Kitchenhart Information     Winning Pitch lets you send messages to your doctor, view your test results, renew your prescriptions, schedule appointments and more. To sign up, go to [email or web address] . Click on \"Log in\" on the left side of the screen, which will take you to the Welcome page. Then click on \"Sign up Now\" on the right side of the page.     You will be asked to enter the access code listed below, as well as some personal information. Please follow the directions to create your username and password.     Your access code is: " [email or web address]  Expires: 10/18/2018  9:09 AM     Your access code will  in 90 days. If you need help or a new code, please call your [place] clinic or [phone].        Care EveryWhere ID     This is your Care EveryWhere ID. This could be used by other organizations to access your [facility] medical records  [id number]        Your Vitals Were     Pulse Respirations Pulse Oximetry BMI (Body Mass Index)          61 16 99% 28.87 kg/m2         Blood Pressure from Last 3 Encounters:   18 116/74   18 (!) 143/100   18 (!) 146/98    Weight from Last 3 Encounters:   18 93.9 kg (207 lb)   18 93.9 kg (207 lb)   17 97.7 kg (215 lb 6.4 oz)              We Performed the Following     ADMIN 1st VACCINE     Basic Metabolic Panel (8) ([facility])     HEPATITIS B VACCINE, ADULT, IM        Primary Care Provider Office Phone # Fax #    Mary Lou Montano [phone] [address] [facility] [address]        Equal Access to Services     [facility]: Hadii aad ku hadasho Soomaali, waaxda luqadaha, qaybta kaalmada adeegyada, waxay samantha hayaubreyn cruzito myers ah. So [facility] [phone].    ATENCIÓN: Si habla español, tiene a carpio disposición servicios gratuitos de asistencia lingüística. Llame al [phone].    We comply with applicable federal civil rights laws and [place] laws. We do not discriminate on the basis of race, color, national origin, age, disability, sex, sexual orientation, or gender identity.            Thank you!     Thank you for choosing [facility]  for your care. Our goal is always to provide you with excellent care. Hearing back from our patients is one way we can continue to improve our services. Please take a few minutes to complete the written survey that you may receive in the mail after your visit with us. Thank you!             Your Updated Medication List - Protect others around you: Learn how to safely use, store and throw  away your medicines at www.disposemymeds.org.          This list is accurate as of 8/22/18  8:51 AM.  Always use your most recent med list.                   Brand Name Dispense Instructions for use Diagnosis    acetaminophen 500 MG tablet    TYLENOL     Take 500-1,000 mg by mouth as needed for mild pain        atenolol 25 MG tablet    TENORMIN          clonazePAM 0.5 MG tablet    klonoPIN    6 tablet    Take 1 tablet (0.5 mg) by mouth 2 times daily for 3 days        DENTA 5000 PLUS 1.1 % Crea   Generic drug:  Sodium Fluoride           disulfiram 250 MG tablet    ANTABUSE          FLUoxetine 10 MG capsule    PROzac    3 capsule    Take 1 capsule (10 mg) by mouth daily for 3 days

## 2018-08-23 LAB
BUN SERPL-MCNC: 11 MG/DL (ref 6–24)
BUN/CREATININE RATIO: 11 (ref 9–20)
CALCIUM SERPL-MCNC: 10 MG/DL (ref 8.7–10.2)
CHLORIDE SERPLBLD-SCNC: 96 MMOL/L (ref 96–106)
CREAT SERPL-MCNC: 1.01 MG/DL (ref 0.76–1.27)
EGFR IF AFRICN AM: 100 ML/MIN/1.73
EGFR IF NONAFRICN AM: 87 ML/MIN/1.73
GLUCOSE SERPL-MCNC: 81 MG/DL (ref 65–99)
POTASSIUM SERPL-SCNC: 5 MMOL/L (ref 3.5–5.2)
SODIUM SERPL-SCNC: 135 MMOL/L (ref 134–144)
TOTAL CO2: 24 MMOL/L (ref 20–29)

## 2018-09-13 ENCOUNTER — TRANSFERRED RECORDS (OUTPATIENT)
Dept: FAMILY MEDICINE | Facility: CLINIC | Age: 48
End: 2018-09-13
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
## 2018-09-19 PROBLEM — Z86.0100 HISTORY OF COLONIC POLYPS: Status: ACTIVE | Noted: 2018-09-19

## 2018-09-21 ENCOUNTER — OFFICE VISIT (OUTPATIENT)
Dept: FAMILY MEDICINE | Facility: CLINIC | Age: 48
End: 2018-09-21

## 2018-09-21 VITALS
RESPIRATION RATE: 16 BRPM | BODY MASS INDEX: 29.57 KG/M2 | HEART RATE: 68 BPM | SYSTOLIC BLOOD PRESSURE: 118 MMHG | DIASTOLIC BLOOD PRESSURE: 86 MMHG | WEIGHT: 212 LBS | OXYGEN SATURATION: 98 %

## 2018-09-21 DIAGNOSIS — F10.21 ALCOHOL DEPENDENCE IN REMISSION (H): ICD-10-CM

## 2018-09-21 DIAGNOSIS — Z23 NEED FOR HEPATITIS B VACCINATION: Primary | ICD-10-CM

## 2018-09-21 DIAGNOSIS — I10 BENIGN ESSENTIAL HYPERTENSION: ICD-10-CM

## 2018-09-21 DIAGNOSIS — E87.5 HYPERPOTASSEMIA: ICD-10-CM

## 2018-09-21 DIAGNOSIS — T78.40XD ALLERGIC STATE, SUBSEQUENT ENCOUNTER: ICD-10-CM

## 2018-09-21 DIAGNOSIS — E55.9 VITAMIN D DEFICIENCY: ICD-10-CM

## 2018-09-21 DIAGNOSIS — F43.10 PTSD (POST-TRAUMATIC STRESS DISORDER): ICD-10-CM

## 2018-09-21 PROCEDURE — 90471 IMMUNIZATION ADMIN: CPT | Performed by: FAMILY MEDICINE

## 2018-09-21 PROCEDURE — 90746 HEPB VACCINE 3 DOSE ADULT IM: CPT | Performed by: FAMILY MEDICINE

## 2018-09-21 PROCEDURE — 99214 OFFICE O/P EST MOD 30 MIN: CPT | Mod: 25 | Performed by: FAMILY MEDICINE

## 2018-09-21 RX ORDER — LORATADINE 10 MG/1
10 TABLET ORAL DAILY
Qty: 30 TABLET | Refills: 1 | COMMUNITY
Start: 2018-09-21

## 2018-09-21 NOTE — MR AVS SNAPSHOT
After Visit Summary   9/21/2018    Hans Jules    MRN: [id number]           Patient Information     Date Of Birth          1970        Visit Information        Provider Department      9/21/2018 8:15 AM Mary Lou Montano MD [facility]        Today's Diagnoses     Need for hepatitis B vaccination    -  1    Allergic state, subsequent encounter          Care Instructions    Waiting on flu shot    Hepatitis B vaccine due in February 6 months from August    Continue sobriety                  Follow-ups after your visit        Your next 10 appointments already scheduled     Feb 22, 2019  8:15 AM CST   LAB with [facility]   [facility]    [address] [place] [address] [phone]           Please do not eat 10-12 hours before your appointment if you are coming in fasting for labs on lipids, cholesterol, or glucose (sugar). This does not apply to pregnant women. Water, hot tea and black coffee (with nothing added) are okay. Do not drink other fluids, diet soda or chew gum.              Who to contact     If you have questions or need follow up information about today's clinic visit or your schedule please contact [facility] directly at [phone].  Normal or non-critical lab and imaging results will be communicated to you by Fanbasehart, letter or phone within 4 business days after the clinic has received the results. If you do not hear from us within 7 days, please contact the clinic through Autobaset or phone. If you have a critical or abnormal lab result, we will notify you by phone as soon as possible.  Submit refill requests through Amiigo or call your pharmacy and they will forward the refill request to us. Please allow 3 business days for your refill to be completed.          Additional Information About Your Visit        Fanbasehart Information     Amiigo lets you send messages to your doctor, view your test results,  "renew your prescriptions, schedule appointments and more. To sign up, go to [email or web address] . Click on \"Log in\" on the left side of the screen, which will take you to the Welcome page. Then click on \"Sign up Now\" on the right side of the page.     You will be asked to enter the access code listed below, as well as some personal information. Please follow the directions to create your username and password.     Your access code is: [email or web address]  Expires: 10/18/2018  9:09 AM     Your access code will  in 90 days. If you need help or a new code, please call your [facility] clinic or [phone].        Care EveryWhere ID     This is your Care EveryWhere ID. This could be used by other organizations to access your [facility] medical records  [id number]        Your Vitals Were     Pulse Respirations Pulse Oximetry BMI (Body Mass Index)          68 16 98% 29.57 kg/m2         Blood Pressure from Last 3 Encounters:   18 118/86   18 114/74   18 (!) 143/100    Weight from Last 3 Encounters:   18 96.2 kg (212 lb)   18 93.9 kg (207 lb)   18 93.9 kg (207 lb)              We Performed the Following     ADMIN 1st VACCINE     HEPATITIS B VACCINE, ADULT, IM        Primary Care Provider Office Phone # Fax #    Mary Lou Montano [phone] [address]  Formerly named [facility] [id number]        Equal Access to Services     [facility]: Hadii aad ku hadasho Soomaali, waaxda luqadaha, qaybta kaalmada adeegyada, lissy myers . So [facility] [phone].    ATENCIÓN: Si habla español, tiene a carpio disposición servicios gratuitos de asistencia lingüística. Llame al [phone].    We comply with applicable federal civil rights laws and [place] laws. We do not discriminate on the basis of race, color, national origin, age, disability, sex, sexual orientation, or gender identity.            Thank you!     Thank you for choosing [facility]  " for your care. Our goal is always to provide you with excellent care. Hearing back from our patients is one way we can continue to improve our services. Please take a few minutes to complete the written survey that you may receive in the mail after your visit with us. Thank you!             Your Updated Medication List - Protect others around you: Learn how to safely use, store and throw away your medicines at www.disposemymeds.org.          This list is accurate as of 9/21/18  8:44 AM.  Always use your most recent med list.                   Brand Name Dispense Instructions for use Diagnosis    acetaminophen 500 MG tablet    TYLENOL     Take 500-1,000 mg by mouth as needed for mild pain        atenolol 25 MG tablet    TENORMIN     25 mg 2 times daily        CLARITIN 10 MG tablet   Generic drug:  loratadine     30 tablet    Take 1 tablet (10 mg) by mouth daily    Allergic state, subsequent encounter       DENTA 5000 PLUS 1.1 % Crea   Generic drug:  Sodium Fluoride           disulfiram 250 MG tablet    ANTABUSE          FLUoxetine 20 MG capsule    PROzac

## 2018-09-21 NOTE — PATIENT INSTRUCTIONS
Waiting on flu shot    Hepatitis B vaccine due in February 6 months from August    Continue sobriety

## 2019-02-22 DIAGNOSIS — Z23 NEED FOR HEPATITIS B VACCINATION: Primary | ICD-10-CM

## 2019-02-22 PROCEDURE — 90746 HEPB VACCINE 3 DOSE ADULT IM: CPT | Performed by: FAMILY MEDICINE

## 2019-02-22 PROCEDURE — 90471 IMMUNIZATION ADMIN: CPT | Performed by: FAMILY MEDICINE

## 2021-05-03 ENCOUNTER — OFFICE VISIT (OUTPATIENT)
Dept: FAMILY MEDICINE | Facility: CLINIC | Age: 51
End: 2021-05-03

## 2021-05-03 VITALS
OXYGEN SATURATION: 98 % | HEART RATE: 76 BPM | HEIGHT: 71 IN | WEIGHT: 215 LBS | DIASTOLIC BLOOD PRESSURE: 85 MMHG | TEMPERATURE: 98.5 F | SYSTOLIC BLOOD PRESSURE: 139 MMHG | BODY MASS INDEX: 30.1 KG/M2

## 2021-05-03 DIAGNOSIS — K62.5 BRBPR (BRIGHT RED BLOOD PER RECTUM): ICD-10-CM

## 2021-05-03 DIAGNOSIS — I10 BENIGN ESSENTIAL HYPERTENSION: ICD-10-CM

## 2021-05-03 DIAGNOSIS — D36.9 TUBULAR ADENOMA: ICD-10-CM

## 2021-05-03 DIAGNOSIS — K92.1 BLOOD IN STOOL: Primary | ICD-10-CM

## 2021-05-03 PROCEDURE — 93050 ART PRESSURE WAVEFORM ANALYS: CPT | Performed by: FAMILY MEDICINE

## 2021-05-03 PROCEDURE — 99213 OFFICE O/P EST LOW 20 MIN: CPT | Performed by: FAMILY MEDICINE

## 2021-05-03 RX ORDER — CLONAZEPAM 0.5 MG/1
TABLET ORAL
COMMUNITY
Start: 2021-04-08

## 2021-05-03 ASSESSMENT — MIFFLIN-ST. JEOR: SCORE: 1852.36

## 2021-05-03 NOTE — PROGRESS NOTES
"  Deysi Maxwell is a 50 year old patient who presents to clinic for evaluation.  Reports BRBPR for about 6 weeks.  Intermittent but feels he notices it about 60-70% of the time during BM.  Blood is in toilet but not necessarily in stool.  No fever, chills, abd pain.  Occasional diarrhea.  Colonoscopy 2018 had tubular adenoma      Review of Systems   Constitutional, HEENT, cardiovascular, pulmonary, GI, , musculoskeletal, neuro, skin, endocrine and psych systems are negative, except as otherwise noted.      Objective    /85   Pulse 76   Temp 98.5  F (36.9  C)   Ht 1.803 m (5' 11\")   Wt 97.5 kg (215 lb)   SpO2 98%   BMI 29.99 kg/m      General: Well appearing, NAD  Rectal: no external abnormalities.  Small area of increased erythema at 7:00 without fissure or obvious hemorrhoid.  Anoscopy unrevealing.  Psych: normal mood and affect        Assessment & Plan     Blood in stool  DDx is broad and does need further evaluation due to duration.  Consult placed to discuss colonoscopy vs flex sig vs further IBD workup.    Benign essential hypertension  At goal  - MD ART PRESS WAVEFORM ANALYS CENTRAL ART PRESSURE    BRBPR (bright red blood per rectum)  - GASTROENTEROLOGY ADULT REF CONSULT ONLY; Future    Tubular adenoma        Jean Walden MD  [facility]          "

## 2021-05-24 ENCOUNTER — TRANSFERRED RECORDS (OUTPATIENT)
Dept: FAMILY MEDICINE | Facility: CLINIC | Age: 51
End: 2021-05-24

## 2021-06-01 ENCOUNTER — TRANSFERRED RECORDS (OUTPATIENT)
Dept: FAMILY MEDICINE | Facility: CLINIC | Age: 51
End: 2021-06-01

## 2021-06-01 ENCOUNTER — TRANSCRIBE ORDERS (OUTPATIENT)
Dept: FAMILY MEDICINE | Facility: CLINIC | Age: 51
End: 2021-06-01

## 2024-01-29 ENCOUNTER — TRANSFERRED RECORDS (OUTPATIENT)
Dept: FAMILY MEDICINE | Facility: CLINIC | Age: 54
End: 2024-01-29

## 2025-06-03 ENCOUNTER — OFFICE VISIT (OUTPATIENT)
Dept: FAMILY MEDICINE | Facility: CLINIC | Age: 55
End: 2025-06-03

## 2025-06-03 ENCOUNTER — RESULTS FOLLOW-UP (OUTPATIENT)
Dept: FAMILY MEDICINE | Facility: CLINIC | Age: 55
End: 2025-06-03

## 2025-06-03 VITALS
DIASTOLIC BLOOD PRESSURE: 90 MMHG | SYSTOLIC BLOOD PRESSURE: 152 MMHG | HEART RATE: 71 BPM | HEIGHT: 72 IN | OXYGEN SATURATION: 98 % | WEIGHT: 234.2 LBS | BODY MASS INDEX: 31.72 KG/M2

## 2025-06-03 DIAGNOSIS — I10 BENIGN ESSENTIAL HYPERTENSION: ICD-10-CM

## 2025-06-03 DIAGNOSIS — F10.21 ALCOHOL DEPENDENCE IN REMISSION (H): ICD-10-CM

## 2025-06-03 DIAGNOSIS — Z13.1 SCREENING FOR DIABETES MELLITUS: ICD-10-CM

## 2025-06-03 DIAGNOSIS — E66.09 CLASS 1 OBESITY DUE TO EXCESS CALORIES WITH SERIOUS COMORBIDITY AND BODY MASS INDEX (BMI) OF 31.0 TO 31.9 IN ADULT: Primary | ICD-10-CM

## 2025-06-03 DIAGNOSIS — Z23 NEED FOR VACCINATION: ICD-10-CM

## 2025-06-03 DIAGNOSIS — F41.1 GENERALIZED ANXIETY DISORDER: ICD-10-CM

## 2025-06-03 DIAGNOSIS — E66.811 CLASS 1 OBESITY DUE TO EXCESS CALORIES WITH SERIOUS COMORBIDITY AND BODY MASS INDEX (BMI) OF 31.0 TO 31.9 IN ADULT: Primary | ICD-10-CM

## 2025-06-03 LAB
EST. AVERAGE GLUCOSE BLD GHB EST-MCNC: 94 MG/DL
HBA1C MFR BLD: 4.9 %

## 2025-06-03 PROCEDURE — 90677 PCV20 VACCINE IM: CPT

## 2025-06-03 PROCEDURE — 3077F SYST BP >= 140 MM HG: CPT

## 2025-06-03 PROCEDURE — 83036 HEMOGLOBIN GLYCOSYLATED A1C: CPT

## 2025-06-03 PROCEDURE — 99204 OFFICE O/P NEW MOD 45 MIN: CPT | Mod: 25

## 2025-06-03 PROCEDURE — 3080F DIAST BP >= 90 MM HG: CPT

## 2025-06-03 PROCEDURE — 36415 COLL VENOUS BLD VENIPUNCTURE: CPT

## 2025-06-03 PROCEDURE — 90471 IMMUNIZATION ADMIN: CPT

## 2025-06-03 RX ORDER — TRAZODONE HYDROCHLORIDE 50 MG/1
TABLET ORAL
COMMUNITY

## 2025-06-03 NOTE — PROGRESS NOTES
Assessment & Plan     Class 1 obesity due to excess calories with serious comorbidity and body mass index (BMI) of 31.0 to 31.9 in adult  Body mass index is 31.98 kg/m . Requesting Ozempic to assist with weight loss and alcohol dependence. Reviewed pillars of weight loss and importance of a healthy weight, along with diet and exercise. Discussed focusing on continued sobriety and revisiting medical weight management once stable in remission. See plan below.     Alcohol dependence in remission (H)  Reports sober for 3 days. IOP and recovery meetings. Early but congratulated on continued sobriety. Referral placed for addiction medicine for further assistance and follow up. Red flags that warrant emergent evaluation discussed. Follow up as needed for new or worsening symptoms or if symptoms fail to improve. Patient agreeable to plan. All questions answered.   - Adult Mental Health  Referral - To a [facility] Location    Benign essential hypertension  Blood pressure was elevated at today's visit. Reviewed contributing factors. Not on medications. Recommendations include monitoring blood pressures over the next 1-2 weeks and if blood pressure continues to be elevated over 140/90 recommend follow up for a blood pressure management visit. Discussed importance of a healthy weight, along with diet and exercise. Patient agreeable to plan. All questions answered.     Generalized anxiety disorder  Taking Fluoxetine, Atenolol, Trazodone and Clonazepam as needed. Stable. Following with [facility]. Continue current medication regimen and treatment plan.     Screening for diabetes mellitus  Declines additional lab work today, but agreeable to A1c for screening for DM.   - VENOUS COLLECTION  - Hemoglobin A1c  - Hemoglobin A1c    Need for vaccination  - PNEUMOCOCCAL 20 VALENT CONJUGATE (PREVNAR 20)  - VACCINE ADMINISTRATION, INITIAL          BMI  Estimated body mass index is 31.98 kg/m  as calculated from  "the following:    Height as of this encounter: 1.822 m (5' 11.75\").    Weight as of this encounter: 106.2 kg (234 lb 3.2 oz).   Weight management plan: Discussed healthy diet and exercise guidelines      Follow-up  Return for Follow up, Routine preventive.    Deysi Maxwell is a 55 year old, presenting for the following health issues:  Medication Request (Would like to discuss getting on a GLP-1/Pt is fasting today ) and Imm/Inj (Will do prevnar today )    Imm/Inj    History of Present Illness       Reason for visit:  Physical and GLP-1 approval   He is taking medications regularly.        Recovering alcoholic. Third day of withdrawal. Longest sobriety has been 6 months. Going to group therapy. BP should come down. Therapy every 2 weeks or maybe every week if not in a good spot.     Overweight. Body mass index is 31.98 kg/m . Wanting to get on Ozempic. Summer will be more active.     Anxiety: Taking Fluoxetine and Atenolol daily and Clonazepam as needed. Occ takes one at night when trazodone isn't working. Following with psychiatry. [facility]. Been with since 2015. IOP and recovery meetings. Didn't like AA.     Review of Systems  Constitutional, HEENT, cardiovascular, pulmonary, GI, , musculoskeletal, neuro, skin, endocrine and psych systems are negative, except as otherwise noted.      Objective    BP (!) 152/90   Pulse 71   Ht 1.822 m (5' 11.75\")   Wt 106.2 kg (234 lb 3.2 oz)   SpO2 98%   BMI 31.98 kg/m    Body mass index is 31.98 kg/m .  Physical Exam   GENERAL: alert and no distress  EYES: Eyes grossly normal to inspection, PERRL and conjunctivae and sclerae normal  RESP: lungs clear to auscultation - no rales, rhonchi or wheezes  CV: regular rate and rhythm, normal S1 S2, no S3 or S4, no murmur, click or rub, no peripheral edema  MS: no gross musculoskeletal defects noted, no edema  PSYCH: mentation appears normal, affect normal/bright    No results found for this or any previous visit " (from the past 24 hours).        Signed Electronically by: KEILA Manley CNP

## 2025-06-04 ENCOUNTER — PATIENT OUTREACH (OUTPATIENT)
Dept: CARE COORDINATION | Facility: CLINIC | Age: 55
End: 2025-06-04
Payer: COMMERCIAL

## 2025-06-07 ENCOUNTER — HEALTH MAINTENANCE LETTER (OUTPATIENT)
Age: 55
End: 2025-06-07